# Patient Record
Sex: MALE | Race: WHITE | NOT HISPANIC OR LATINO | URBAN - METROPOLITAN AREA
[De-identification: names, ages, dates, MRNs, and addresses within clinical notes are randomized per-mention and may not be internally consistent; named-entity substitution may affect disease eponyms.]

---

## 2017-11-15 ENCOUNTER — TELEPHONE (OUTPATIENT)
Dept: CARDIOLOGY | Facility: MEDICAL CENTER | Age: 42
End: 2017-11-15

## 2017-11-15 NOTE — TELEPHONE ENCOUNTER
"Spoke with patient; first time seeing a cardiologist; no recent testing done; c/o heart racing while laying in bed at night and feeling \"off\"; has been having pains on and off and according to him, \"they are here, there, and then over here\"; patient understands where our office is and will be here for his appointment.  "

## 2017-11-16 ENCOUNTER — OFFICE VISIT (OUTPATIENT)
Dept: CARDIOLOGY | Facility: MEDICAL CENTER | Age: 42
End: 2017-11-16
Payer: COMMERCIAL

## 2017-11-16 VITALS
HEIGHT: 71 IN | HEART RATE: 74 BPM | WEIGHT: 274 LBS | DIASTOLIC BLOOD PRESSURE: 96 MMHG | BODY MASS INDEX: 38.36 KG/M2 | SYSTOLIC BLOOD PRESSURE: 132 MMHG | OXYGEN SATURATION: 94 %

## 2017-11-16 DIAGNOSIS — R20.0 LEFT ARM NUMBNESS: ICD-10-CM

## 2017-11-16 DIAGNOSIS — I10 ESSENTIAL HYPERTENSION, BENIGN: ICD-10-CM

## 2017-11-16 DIAGNOSIS — R00.2 PALPITATIONS: ICD-10-CM

## 2017-11-16 DIAGNOSIS — Z82.49 FAMILY HISTORY OF EARLY CAD: ICD-10-CM

## 2017-11-16 LAB — EKG IMPRESSION: NORMAL

## 2017-11-16 PROCEDURE — 99204 OFFICE O/P NEW MOD 45 MIN: CPT | Performed by: INTERNAL MEDICINE

## 2017-11-16 PROCEDURE — 93000 ELECTROCARDIOGRAM COMPLETE: CPT | Performed by: INTERNAL MEDICINE

## 2017-11-16 RX ORDER — ALLOPURINOL 100 MG/1
100 TABLET ORAL DAILY
COMMUNITY

## 2017-11-16 RX ORDER — INDOMETHACIN 50 MG/1
50 CAPSULE ORAL 3 TIMES DAILY PRN
COMMUNITY

## 2017-11-16 RX ORDER — BENAZEPRIL HYDROCHLORIDE 40 MG/1
40 TABLET ORAL DAILY
COMMUNITY
End: 2018-01-24 | Stop reason: SDUPTHER

## 2017-11-16 RX ORDER — AMLODIPINE BESYLATE 10 MG/1
10 TABLET ORAL DAILY
COMMUNITY
End: 2018-01-24 | Stop reason: SDUPTHER

## 2017-11-16 RX ORDER — METOPROLOL SUCCINATE 25 MG/1
25 TABLET, EXTENDED RELEASE ORAL DAILY
COMMUNITY
End: 2018-01-24 | Stop reason: SDUPTHER

## 2017-11-16 NOTE — PROGRESS NOTES
Subjective:   Michele Garner is a 42 y.o. male who presents today For initial consultation regarding palpitations and left arm numbness. He is a  with a history of hypertension for at least a decade as well as obstructive sleep apnea currently on Cipro. His blood pressures been well controlled. He has a history of PVCs controlled with metoprolol. He notes that over the past several weeks to months he has left inferior forearm and pinky numbness especially when driving rests his elbow on the center console which exacerbates it. Also of note 2 days ago he was awoken from sleep suddenly with racing heartbeats that slowly subsided. He was wearing his seatbelt at the time. He does not recall having a bad dream. 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 He has not had any syncope or chest pain. He has no exertional symptoms. He is not particularly active. He has a strong family history of precocious coronary disease.    Denies any other cardiovascular symptoms including chest pain, shortness of breath, dyspnea on exertion, lightheadedness, syncope or presyncope, lower extremity edema, PND, orthopnea or palpitations.      Past Medical History:   Diagnosis Date   • HTN (hypertension)      History reviewed. No pertinent surgical history.  Family History   Problem Relation Age of Onset   • Heart Attack Father 55   • Heart Disease Father      History   Smoking Status   • Former Smoker   • Quit date: 11/16/2007   Smokeless Tobacco   • Former User   • Types: Chew   • Quit date: 11/16/2007     Allergies   Allergen Reactions   • Chlorthalidone      Outpatient Encounter Prescriptions as of 11/16/2017   Medication Sig Dispense Refill   • indomethacin (INDOCIN) 50 MG Cap Take 50 mg by mouth 3 times a day as needed.     • amlodipine (NORVASC) 10 MG Tab Take 10 mg by mouth every day.     • allopurinol (ZYLOPRIM) 100 MG Tab  "Take 200 mg by mouth every day.     • benazepril (LOTENSIN) 40 MG tablet Take 40 mg by mouth every day.     • metoprolol SR (TOPROL XL) 25 MG TABLET SR 24 HR Take 25 mg by mouth every day.       No facility-administered encounter medications on file as of 11/16/2017.      Review of Systems   All other systems reviewed and are negative.       Objective:   /96   Pulse 74   Ht 1.803 m (5' 11\")   Wt 124.3 kg (274 lb)   SpO2 94%   BMI 38.22 kg/m²     Physical Exam   Constitutional: He is oriented to person, place, and time. He appears well-developed and well-nourished. No distress.   Overweight   HENT:   Head: Normocephalic and atraumatic.   Mouth/Throat: Oropharynx is clear and moist.   Eyes: Conjunctivae are normal. Pupils are equal, round, and reactive to light. No scleral icterus.   Neck: No JVD present. No tracheal deviation present. No thyromegaly present.   Cardiovascular: Normal rate, regular rhythm, S1 normal, normal heart sounds and intact distal pulses.  PMI is not displaced.  Exam reveals no gallop and no friction rub.    No murmur heard.  Pulses:       Carotid pulses are 2+ on the right side, and 2+ on the left side.       Radial pulses are 2+ on the right side, and 2+ on the left side.        Dorsalis pedis pulses are 2+ on the right side, and 2+ on the left side.        Posterior tibial pulses are 2+ on the right side, and 2+ on the left side.   Pulmonary/Chest: Effort normal and breath sounds normal. He has no wheezes. He has no rales.   Abdominal: Soft. Bowel sounds are normal. He exhibits no distension and no pulsatile midline mass. There is no tenderness. There is no guarding.   Musculoskeletal: He exhibits edema (trace bilateral lower extremities).   Left inferior forearm tingling sensation when palpated near the elbow   Neurological: He is alert and oriented to person, place, and time. No cranial nerve deficit (cranial nerves II through XII grossly intact).   Skin: Skin is warm and dry. No " rash noted. He is not diaphoretic. No erythema.   Psychiatric: He has a normal mood and affect. His behavior is normal. Thought content normal.     EKG (11/16/2017):  I have personally reviewed the EKG this visit and discussed with the patient. It shows sinus rhythm 76 bpm. NORMAL ECG.      Assessment:     1. Palpitations  EKG    ECHOCARDIOGRAM COMP W/O CONT    ECHO-REST/STRESS W/O CONTRAST   2. Left arm numbness  NM-CARDIAC STRESS TEST    ECHO-REST/STRESS W/O CONTRAST   3. Essential hypertension, benign     4. Family history of early CAD  NM-CARDIAC STRESS TEST       Medical Decision Making:  Today's Assessment / Status / Plan:     His left arm numbness seems to be neurologic in origin related to an ulnar neuropathy. We discussed avoidance measures for this and treatment regimen. His palpitations may be a cluster of PVCs versus a need to re-titrate his sleep apnea mask versus a bad dream. He does not recall having a bad dream however. He has significant risk factors for precocious coronary disease himself, with a strong family history and long-standing hypertension as well as sleep apnea obesity and inactivity. Recommend further evaluation. In the meantime he was reassured that these do not sound sinister at the moment, and that further testing will hopefully bear this out.    1. Echocardiogram  2. Stress echocardiogram    Follow-up after testing. Should he have further palpitations we can increase his beta blocker and consider Holter monitor testing.

## 2017-12-19 ENCOUNTER — HOSPITAL ENCOUNTER (OUTPATIENT)
Dept: CARDIOLOGY | Facility: MEDICAL CENTER | Age: 42
End: 2017-12-19
Attending: INTERNAL MEDICINE
Payer: COMMERCIAL

## 2017-12-19 DIAGNOSIS — Z82.49 FAMILY HISTORY OF EARLY CAD: ICD-10-CM

## 2017-12-19 DIAGNOSIS — R20.0 LEFT ARM NUMBNESS: ICD-10-CM

## 2017-12-19 DIAGNOSIS — R00.2 PALPITATIONS: ICD-10-CM

## 2017-12-19 PROCEDURE — 93350 STRESS TTE ONLY: CPT

## 2017-12-19 PROCEDURE — 93306 TTE W/DOPPLER COMPLETE: CPT

## 2017-12-19 PROCEDURE — 93350 STRESS TTE ONLY: CPT | Mod: 26 | Performed by: INTERNAL MEDICINE

## 2017-12-19 PROCEDURE — 93306 TTE W/DOPPLER COMPLETE: CPT | Mod: 26,59 | Performed by: INTERNAL MEDICINE

## 2017-12-19 PROCEDURE — 93017 CV STRESS TEST TRACING ONLY: CPT

## 2017-12-19 PROCEDURE — 93018 CV STRESS TEST I&R ONLY: CPT | Performed by: INTERNAL MEDICINE

## 2017-12-20 LAB
LV EJECT FRACT  99904: 60
LV EJECT FRACT  99904: 60
LV EJECT FRACT MOD 2C 99903: 20.17
LV EJECT FRACT MOD 4C 99902: 37.41
LV EJECT FRACT MOD BP 99901: 28.75

## 2017-12-22 ENCOUNTER — TELEPHONE (OUTPATIENT)
Dept: CARDIOLOGY | Facility: MEDICAL CENTER | Age: 42
End: 2017-12-22

## 2017-12-23 NOTE — TELEPHONE ENCOUNTER
"Pt notified of test results. He is very happy with information. He denies any palpitations and says he has been \"feeling good\". Pt will call back to schedule FU at a later date. He denies further needs or questions at this time.     ---------------------------------------------------  ECHOCARDIOGRAM COMP W/O CONT     Notes Recorded by Erik Underwood M.D. on 12/22/2017 at 4:17 PM PST  Excellent stress test and echocardiogram. Both completely normal and reassuring.     ----- Message from Marifer Hernandez sent at 12/22/2017  3:49 PM PST -----  Regarding: patient calling for echo results  HIEU/Rosaline      Patient had echo on 12/19, wants to get results so he can start exercising again. He can be reached at [phone].                "

## 2018-01-24 ENCOUNTER — TELEPHONE (OUTPATIENT)
Dept: CARDIOLOGY | Facility: MEDICAL CENTER | Age: 43
End: 2018-01-24

## 2018-01-24 DIAGNOSIS — I10 ESSENTIAL HYPERTENSION, BENIGN: ICD-10-CM

## 2018-01-24 DIAGNOSIS — R00.2 PALPITATIONS: ICD-10-CM

## 2018-01-24 RX ORDER — METOPROLOL SUCCINATE 25 MG/1
25 TABLET, EXTENDED RELEASE ORAL DAILY
Qty: 90 TAB | Refills: 1 | OUTPATIENT
Start: 2018-01-24 | End: 2018-07-04 | Stop reason: SDUPTHER

## 2018-01-24 RX ORDER — AMLODIPINE BESYLATE 10 MG/1
10 TABLET ORAL DAILY
Qty: 90 TAB | Refills: 1 | OUTPATIENT
Start: 2018-01-24 | End: 2018-07-04 | Stop reason: SDUPTHER

## 2018-01-24 RX ORDER — BENAZEPRIL HYDROCHLORIDE 40 MG/1
40 TABLET ORAL DAILY
Qty: 90 TAB | Refills: 1 | OUTPATIENT
Start: 2018-01-24 | End: 2018-07-04 | Stop reason: SDUPTHER

## 2018-01-24 NOTE — TELEPHONE ENCOUNTER
Called and s/w pt. He says that he will be in town for another 3 months or so and is needed a refill on cardiac medications. Reassured pt that medication will be called in today. Pt would like to schedule FU with TW, appointment scheduled for 1/31 @1530    Refill called into [facility] for Metoprolol XL 25mg qDaily #90 with 1 refills, Benazepril 40mg qDaily #90 with 1 refill, and Amlodipine 10mg qDaily #90 with 1 refill.     ----- Message from Marifer Hernandez sent at 1/24/2018 12:09 PM PST -----  Regarding: asking if Dr. Underwood will refill his heart medication  TW/Rosaline    Patient is asking if Dr. Underwood would be willing to refill his Metoprolol, Amlodipine and Benazepril, or would he need to be seen in the office first? He states he has about 9 days left on his medication, and his doctor in [place] is unwilling to refill them without an office visit first. He can be reached at [phone].

## 2018-01-31 ENCOUNTER — OFFICE VISIT (OUTPATIENT)
Dept: CARDIOLOGY | Facility: MEDICAL CENTER | Age: 43
End: 2018-01-31
Payer: COMMERCIAL

## 2018-01-31 VITALS
OXYGEN SATURATION: 93 % | BODY MASS INDEX: 38.92 KG/M2 | DIASTOLIC BLOOD PRESSURE: 94 MMHG | SYSTOLIC BLOOD PRESSURE: 152 MMHG | HEIGHT: 71 IN | HEART RATE: 80 BPM | WEIGHT: 278 LBS

## 2018-01-31 DIAGNOSIS — Z82.49 FAMILY HISTORY OF EARLY CAD: ICD-10-CM

## 2018-01-31 DIAGNOSIS — E66.9 OBESITY WITHOUT SERIOUS COMORBIDITY, UNSPECIFIED CLASSIFICATION, UNSPECIFIED OBESITY TYPE: ICD-10-CM

## 2018-01-31 DIAGNOSIS — I51.7 LVH (LEFT VENTRICULAR HYPERTROPHY): ICD-10-CM

## 2018-01-31 DIAGNOSIS — G47.33 OSA ON CPAP: ICD-10-CM

## 2018-01-31 DIAGNOSIS — I10 ESSENTIAL HYPERTENSION, BENIGN: ICD-10-CM

## 2018-01-31 PROBLEM — R20.0 LEFT ARM NUMBNESS: Status: RESOLVED | Noted: 2017-11-16 | Resolved: 2018-01-31

## 2018-01-31 PROBLEM — R00.2 PALPITATIONS: Status: RESOLVED | Noted: 2017-11-16 | Resolved: 2018-01-31

## 2018-01-31 PROCEDURE — 99214 OFFICE O/P EST MOD 30 MIN: CPT | Performed by: INTERNAL MEDICINE

## 2018-01-31 NOTE — PROGRESS NOTES
Subjective:   Michele Garner is a 42 y.o. male who presents today for follow-up of left arm numbness and hypertension. He is a  with a history of hypertension for at least a decade as well as obstructive sleep apnea currently on Cipro. His blood pressures been well controlled. He has a history of PVCs controlled with metoprolol. He notes that over the past several weeks to months he has left inferior forearm and pinky numbness especially when driving rests his elbow on the center console which exacerbates it. Also of note 2 days ago he was awoken from sleep suddenly with racing heartbeats that slowly subsided. He was wearing his seatbelt at the time. He does not recall having a bad dream. 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 He has not had any syncope or chest pain. He has no exertional symptoms. He is not particularly active. He has a strong family history of precocious coronary disease.    In the interim with ulnar nerve compression syndrome alterations he has resolution of his hand numbness. He had a normal echocardiogram site for mild LVH and normal stress echo. Get a superior functional capacity achieving >12 METs.    Denies any other cardiovascular symptoms including chest pain, shortness of breath, dyspnea on exertion, lightheadedness, syncope or presyncope, lower extremity edema, PND, orthopnea or palpitations.      Past Medical History:   Diagnosis Date   • HTN (hypertension)      History reviewed. No pertinent surgical history.  Family History   Problem Relation Age of Onset   • Heart Attack Father 55   • Heart Disease Father      History   Smoking Status   • Former Smoker   • Quit date: 11/16/2007   Smokeless Tobacco   • Former User   • Types: Chew   • Quit date: 11/16/2007     Allergies   Allergen Reactions   • Chlorthalidone      Outpatient Encounter Prescriptions as of 1/31/2018  "  Medication Sig Dispense Refill   • amlodipine (NORVASC) 10 MG Tab Take 1 Tab by mouth every day. 90 Tab 1   • benazepril (LOTENSIN) 40 MG tablet Take 1 Tab by mouth every day. 90 Tab 1   • metoprolol SR (TOPROL XL) 25 MG TABLET SR 24 HR Take 1 Tab by mouth every day. 90 Tab 1   • indomethacin (INDOCIN) 50 MG Cap Take 50 mg by mouth 3 times a day as needed.     • allopurinol (ZYLOPRIM) 100 MG Tab Take 200 mg by mouth every day.       No facility-administered encounter medications on file as of 1/31/2018.      Review of Systems   All other systems reviewed and are negative.       Objective:   /94   Pulse 80   Ht 1.803 m (5' 11\")   Wt (!) 126.1 kg (278 lb)   SpO2 93%   BMI 38.77 kg/m²     Physical Exam   Constitutional: He is oriented to person, place, and time. He appears well-developed and well-nourished. No distress.   Overweight   HENT:   Head: Normocephalic and atraumatic.   Mouth/Throat: Oropharynx is clear and moist.   Eyes: Conjunctivae are normal. Pupils are equal, round, and reactive to light. No scleral icterus.   Neck: No JVD present. No tracheal deviation present. No thyromegaly present.   Cardiovascular: Normal rate, regular rhythm, S1 normal, normal heart sounds and intact distal pulses.  PMI is not displaced.  Exam reveals no gallop and no friction rub.    No murmur heard.  Pulses:       Carotid pulses are 2+ on the right side, and 2+ on the left side.       Radial pulses are 2+ on the right side, and 2+ on the left side.        Dorsalis pedis pulses are 2+ on the right side, and 2+ on the left side.        Posterior tibial pulses are 2+ on the right side, and 2+ on the left side.   Pulmonary/Chest: Effort normal and breath sounds normal. He has no wheezes. He has no rales.   Abdominal: Soft. Bowel sounds are normal. He exhibits no distension and no pulsatile midline mass. There is no tenderness. There is no guarding.   Musculoskeletal: He exhibits no edema or tenderness.   Neurological: He " is alert and oriented to person, place, and time. No cranial nerve deficit (cranial nerves II through XII grossly intact).   Skin: Skin is warm and dry. No rash noted. He is not diaphoretic. No erythema.   Psychiatric: He has a normal mood and affect. His behavior is normal. Thought content normal.     ECHO CONCLUSIONS (12/19/2017):  Normal left ventricular size, systolic function, and diastolic   function.  Mild left ventricular hypertrophy.  Left ventricular ejection fraction is visually estimated to be 60%.  No significant valve abnormalities.   Normal inferior vena cava size and inspiratory collapse.  No prior study is available for comparison.       STRESS (12/19/2017):  Negative stress echocardiogram for ischemia with adequate stress achieved by   heart rate criteria.   Excellent exercise tolerance, achieving 12.8 METS and 91% of max predicted   heart rate.       EKG (11/16/2017):  I have personally reviewed the EKG this visit and discussed with the patient. It shows sinus rhythm 76 bpm. NORMAL ECG.      Assessment:     1. Essential hypertension, benign     2. Family history of early CAD     3. LVH (left ventricular hypertrophy)  REFERRAL TO NUTRITION SERVICES   4. Obesity without serious comorbidity, unspecified classification, unspecified obesity type  REFERRAL TO NUTRITION SERVICES   5. GERARDO on CPAP         Medical Decision Making:  Today's Assessment / Status / Plan:     Blood pressures at home are better. Recheck today after he sat for 2 minutes was in the low 140s to high 130s. Recommend he get a blood pressure cuff and check this at home. Given his LVH think we should recheck an echocardiogram after he is aggressive about his blood pressure control over the next 18 months to 24 months. Otherwise his numb arm numbness was due to ulnar compression from posture and has resolved.      Follow-up in 18 months

## 2018-02-01 ENCOUNTER — TELEPHONE (OUTPATIENT)
Dept: CARDIOLOGY | Facility: MEDICAL CENTER | Age: 43
End: 2018-02-01

## 2018-02-01 NOTE — TELEPHONE ENCOUNTER
Message     [facility] referral will be faxed to:   [facility]   [phone]     Noted.

## 2018-07-04 DIAGNOSIS — R00.2 PALPITATIONS: ICD-10-CM

## 2018-07-04 DIAGNOSIS — I10 ESSENTIAL HYPERTENSION, BENIGN: ICD-10-CM

## 2018-07-05 RX ORDER — AMLODIPINE BESYLATE 10 MG/1
TABLET ORAL
Qty: 90 TAB | Refills: 3 | Status: SHIPPED | OUTPATIENT
Start: 2018-07-05 | End: 2024-03-11

## 2018-07-05 RX ORDER — METOPROLOL SUCCINATE 25 MG/1
TABLET, EXTENDED RELEASE ORAL
Qty: 90 TAB | Refills: 3 | Status: SHIPPED | OUTPATIENT
Start: 2018-07-05

## 2018-07-05 RX ORDER — BENAZEPRIL HYDROCHLORIDE 40 MG/1
TABLET ORAL
Qty: 90 TAB | Refills: 3 | Status: SHIPPED | OUTPATIENT
Start: 2018-07-05

## 2022-06-02 ENCOUNTER — OFFICE VISIT (OUTPATIENT)
Dept: URGENT CARE | Facility: PHYSICIAN GROUP | Age: 47
End: 2022-06-02
Payer: COMMERCIAL

## 2022-06-02 VITALS
HEART RATE: 69 BPM | OXYGEN SATURATION: 97 % | SYSTOLIC BLOOD PRESSURE: 122 MMHG | TEMPERATURE: 96.6 F | WEIGHT: 230 LBS | HEIGHT: 71 IN | RESPIRATION RATE: 16 BRPM | DIASTOLIC BLOOD PRESSURE: 86 MMHG | BODY MASS INDEX: 32.2 KG/M2

## 2022-06-02 DIAGNOSIS — J22 LRTI (LOWER RESPIRATORY TRACT INFECTION): ICD-10-CM

## 2022-06-02 PROCEDURE — 99203 OFFICE O/P NEW LOW 30 MIN: CPT | Performed by: PHYSICIAN ASSISTANT

## 2022-06-02 RX ORDER — PREDNISONE 20 MG/1
40 TABLET ORAL DAILY
Qty: 10 TABLET | Refills: 0 | Status: SHIPPED | OUTPATIENT
Start: 2022-06-02 | End: 2022-06-07

## 2022-06-02 RX ORDER — AZITHROMYCIN 250 MG/1
TABLET, FILM COATED ORAL
Qty: 6 TABLET | Refills: 0 | Status: SHIPPED | OUTPATIENT
Start: 2022-06-02 | End: 2024-03-11

## 2022-06-02 ASSESSMENT — ENCOUNTER SYMPTOMS
SINUS PAIN: 0
FEVER: 0
WHEEZING: 0
DIZZINESS: 0
SPUTUM PRODUCTION: 1
SORE THROAT: 0
ABDOMINAL PAIN: 0
PALPITATIONS: 0
MYALGIAS: 0
NAUSEA: 0
HEADACHES: 1
DIARRHEA: 0
CHILLS: 1
DIAPHORESIS: 0
VOMITING: 0
SHORTNESS OF BREATH: 0
COUGH: 1

## 2022-06-02 NOTE — PROGRESS NOTES
Subjective:     CHIEF COMPLAINT  Chief Complaint   Patient presents with   • Cough     Coughing  is dry and cough, cough phlegms yellowish, headache, and sweating at night x3 weeks    • Coronavirus Screening     Tested postive 21st of may. Retested may 26th negative .        HPI  Michele Garner is a very pleasant 47 y.o. male who presents to the clinic with persistent cough and congestion x3 weeks.  Patient states he tested positive for COVID-19 on 5/21/2022.  He then had a negative test on 5/26/2022.  States over the last 3 weeks he has had a cough that is deep and productive of yellowish phlegm.  He denies any associated shortness of breath.  He has had intermittent body aches and chills.  Does not believe he has been running a fever.  He has been taking various over-the-counter cough medications with no improvement.  Tolerating oral intake without complication.    REVIEW OF SYSTEMS  Review of Systems   Constitutional: Positive for chills. Negative for diaphoresis, fever and malaise/fatigue.   HENT: Negative for congestion, ear pain, sinus pain and sore throat.    Respiratory: Positive for cough and sputum production. Negative for shortness of breath and wheezing.    Cardiovascular: Negative for chest pain and palpitations.   Gastrointestinal: Negative for abdominal pain, diarrhea, nausea and vomiting.   Musculoskeletal: Negative for myalgias.   Neurological: Positive for headaches. Negative for dizziness.   Endo/Heme/Allergies: Negative for environmental allergies.       PAST MEDICAL HISTORY  Patient Active Problem List    Diagnosis Date Noted   • GERARDO on CPAP 01/31/2018   • Obesity without serious comorbidity 01/31/2018   • LVH (left ventricular hypertrophy) 01/31/2018   • Essential hypertension, benign 11/16/2017   • Family history of early CAD 11/16/2017       SURGICAL HISTORY  patient denies any surgical history    ALLERGIES  Allergies   Allergen Reactions   • Chlorthalidone        CURRENT MEDICATIONS  Home  "Medications     Reviewed by Charlie Medina P.A.-C. (Physician Assistant) on 22 at 0931  Med List Status: <None>   Medication Last Dose Status   allopurinol (ZYLOPRIM) 100 MG Tab Taking Active   amLODIPine (NORVASC) 10 MG Tab Taking Active   benazepril (LOTENSIN) 40 MG tablet Taking Active   indomethacin (INDOCIN) 50 MG Cap Not Taking Active   metoprolol SR (TOPROL XL) 25 MG TABLET SR 24 HR Taking Active                SOCIAL HISTORY  Social History     Tobacco Use   • Smoking status: Former Smoker     Quit date: 2007     Years since quittin.5   • Smokeless tobacco: Former User     Types: Chew     Quit date: 2007   Substance and Sexual Activity   • Alcohol use: Yes   • Drug use: No   • Sexual activity: Not on file       FAMILY HISTORY  Family History   Problem Relation Age of Onset   • Heart Attack Father 55   • Heart Disease Father           Objective:     VITAL SIGNS: /86   Pulse 69   Temp 35.9 °C (96.6 °F) (Tympanic)   Resp 16   Ht 1.803 m (5' 11\")   Wt 104 kg (230 lb)   SpO2 97%   BMI 32.08 kg/m²     PHYSICAL EXAM  Physical Exam  Constitutional:       General: He is not in acute distress.     Appearance: Normal appearance. He is not ill-appearing, toxic-appearing or diaphoretic.   HENT:      Head: Normocephalic and atraumatic.      Right Ear: Tympanic membrane, ear canal and external ear normal.      Left Ear: Tympanic membrane, ear canal and external ear normal.      Nose: Congestion present. No rhinorrhea.      Mouth/Throat:      Mouth: Mucous membranes are moist.      Pharynx: No oropharyngeal exudate or posterior oropharyngeal erythema.   Eyes:      Conjunctiva/sclera: Conjunctivae normal.   Cardiovascular:      Rate and Rhythm: Normal rate and regular rhythm.      Pulses: Normal pulses.      Heart sounds: Normal heart sounds.   Pulmonary:      Effort: Pulmonary effort is normal.      Breath sounds: Normal breath sounds. No wheezing, rhonchi or rales.   Musculoskeletal:     "  Cervical back: Normal range of motion. No muscular tenderness.   Lymphadenopathy:      Cervical: No cervical adenopathy.   Skin:     General: Skin is warm and dry.      Capillary Refill: Capillary refill takes less than 2 seconds.   Neurological:      Mental Status: He is alert.   Psychiatric:         Mood and Affect: Mood normal.         Thought Content: Thought content normal.         Assessment/Plan:     1. LRTI (lower respiratory tract infection)  - predniSONE (DELTASONE) 20 MG Tab; Take 2 Tablets by mouth every day for 5 days.  Dispense: 10 Tablet; Refill: 0  - azithromycin (ZITHROMAX) 250 MG Tab; Take 2 tablets PO on day one, then 1 tablet PO on day two to five.  Dispense: 6 Tablet; Refill: 0      MDM/Comments:    Patient presents to the clinic with a persistent cough x3 weeks.  Had COVID-19 10 days ago with a subsequent negative test on 5/26/2022.  Has been dealing with a persistent cough that is producing discolored sputum.  Also has intermittent chills and body aches.  On exam lung sounds clear to auscultation.  No wheezes rhonchi or rales.  SPO2 97% on room air.  Due to the nature and length of illness we will begin treatment with oral steroids and antibiotics.  Supportive recommendations discussed.  Increase fluid intake.  Return precautions discussed for any worsening symptoms.    Differential diagnosis, natural history, supportive care, and indications for immediate follow-up discussed. All questions answered. Patient agrees with the plan of care.    Follow-up as needed if symptoms worsen or fail to improve to PCP, Urgent care or Emergency Room.    I have personally reviewed prior external notes and test results pertinent to today's visit.  I have independently reviewed and interpreted all diagnostics ordered during this urgent care acute visit.   Discussed management options (risks,benefits, and alternatives to treatment). Pt expresses understanding and the treatment plan was agreed upon. Questions  were encouraged and answered to pt's satisfaction.    Please note that this dictation was created using voice recognition software. I have made a reasonable attempt to correct obvious errors, but I expect that there are errors of grammar and possibly content that I did not discover before finalizing the note.

## 2024-03-11 ENCOUNTER — OFFICE VISIT (OUTPATIENT)
Dept: CARDIOLOGY | Facility: MEDICAL CENTER | Age: 49
End: 2024-03-11
Attending: INTERNAL MEDICINE
Payer: COMMERCIAL

## 2024-03-11 VITALS
BODY MASS INDEX: 37.44 KG/M2 | RESPIRATION RATE: 14 BRPM | OXYGEN SATURATION: 94 % | HEIGHT: 71 IN | DIASTOLIC BLOOD PRESSURE: 78 MMHG | HEART RATE: 94 BPM | WEIGHT: 267.4 LBS | SYSTOLIC BLOOD PRESSURE: 112 MMHG

## 2024-03-11 DIAGNOSIS — Z76.89 ENCOUNTER TO ESTABLISH CARE: ICD-10-CM

## 2024-03-11 DIAGNOSIS — G47.33 OSA ON CPAP: ICD-10-CM

## 2024-03-11 DIAGNOSIS — E78.5 DYSLIPIDEMIA: ICD-10-CM

## 2024-03-11 DIAGNOSIS — E66.9 OBESITY WITHOUT SERIOUS COMORBIDITY, UNSPECIFIED CLASSIFICATION, UNSPECIFIED OBESITY TYPE: ICD-10-CM

## 2024-03-11 DIAGNOSIS — I51.7 LVH (LEFT VENTRICULAR HYPERTROPHY): ICD-10-CM

## 2024-03-11 DIAGNOSIS — I10 ESSENTIAL HYPERTENSION, BENIGN: ICD-10-CM

## 2024-03-11 PROCEDURE — 3074F SYST BP LT 130 MM HG: CPT | Performed by: INTERNAL MEDICINE

## 2024-03-11 PROCEDURE — 99204 OFFICE O/P NEW MOD 45 MIN: CPT | Performed by: INTERNAL MEDICINE

## 2024-03-11 PROCEDURE — 3078F DIAST BP <80 MM HG: CPT | Performed by: INTERNAL MEDICINE

## 2024-03-11 PROCEDURE — 99202 OFFICE O/P NEW SF 15 MIN: CPT | Performed by: INTERNAL MEDICINE

## 2024-03-11 PROCEDURE — 93005 ELECTROCARDIOGRAM TRACING: CPT | Performed by: INTERNAL MEDICINE

## 2024-03-11 RX ORDER — DOXAZOSIN 2 MG/1
2 TABLET ORAL DAILY
Qty: 90 TABLET | Refills: 3 | Status: SHIPPED | OUTPATIENT
Start: 2024-03-11

## 2024-03-11 RX ORDER — MULTIVIT-MIN/IRON FUM/FOLIC AC 7.5 MG-4
1 TABLET ORAL DAILY
COMMUNITY

## 2024-03-11 NOTE — ASSESSMENT & PLAN NOTE
Regarding his sleep apnea I encouraged him to continue to utilize his CPAP and follow-up closely with his primary sleep medicine specialist regarding ongoing needs given that he has noted a 30 pound weight gain that is likely contributing to changes in his overall CPAP.  It is uncertain whether or not he has an auto titrating CPAP or may need to be transitioned over to BiPAP.

## 2024-03-11 NOTE — ASSESSMENT & PLAN NOTE
Noted on his prior echocardiogram and would recommend that he continue to focus in on lifestyle modification as well as blood pressure management.

## 2024-03-11 NOTE — ASSESSMENT & PLAN NOTE
Clinically, his blood pressure is well-controlled on his current regimen however he does have significant lower extremity edema and is currently on amlodipine grams daily that is likely contributing lower extremity edema as well as likely some underlying varicose veins given that he predominantly stands throughout his stay as well as on very hard surfaces.  At this time we will stop his amlodipine in favor of transitioning over to doxazosin 2 mg daily as well as encouraged to utilize low-grade compression stockings when he is at work and on hard surfaces.  He will continue to monitor his blood pressure at home.

## 2024-03-11 NOTE — PROGRESS NOTES
[facility]    PatientName:Michele GarnerDate: 3/11/2024  :1975    49 y.o.PCP:Erik Underwood M.D.  MRN:[id number]          Problems and Plans    Essential hypertension, benign  Clinically, his blood pressure is well-controlled on his current regimen however he does have significant lower extremity edema and is currently on amlodipine grams daily that is likely contributing lower extremity edema as well as likely some underlying varicose veins given that he predominantly stands throughout his stay as well as on very hard surfaces.  At this time we will stop his amlodipine in favor of transitioning over to doxazosin 2 mg daily as well as encouraged to utilize low-grade compression stockings when he is at work and on hard surfaces.  He will continue to monitor his blood pressure at home.    GERARDO on CPAP  Regarding his sleep apnea I encouraged him to continue to utilize his CPAP and follow-up closely with his primary sleep medicine specialist regarding ongoing needs given that he has noted a 30 pound weight gain that is likely contributing to changes in his overall CPAP.  It is uncertain whether or not he has an auto titrating CPAP or may need to be transitioned over to BiPAP.    LVH (left ventricular hypertrophy)  Noted on his prior echocardiogram and would recommend that he continue to focus in on lifestyle modification as well as blood pressure management.    Obesity without serious comorbidity  Regarding his overall obesity this is likely due to caloric indiscretion and lack of physical exercise as well as likely some underlying insulin resistance.  It would not be unreasonable to check his lipid panel, hemoglobin A1c, CBC and comprehensive panel likely in anticipation of medication to help with insulin resistance.  Further recommendations will follow pending his overall evaluation.    Return in about 3 months (around 2024).      Encounter    Reason for Visit /  Chief Complaint: Hypertension    HPI    49-year-old male with known history of hypertension, borderline impaired fasting glucose, borderline dyslipidemia, gout presents in consultation for establishment of care and ongoing management of his hypertension.    Currently, he notes he is feeling well but notes that he has been hypertensive for roughly 15 years and does monitor his blood pressure at home which she gets systolic blood pressure range of 120 upwards of 140 mmHg and diastolic 80 upwards of 95 mmHg.  He has gained approximately 30 pounds which she cites due to poor diet and lack of exercise.  He works as a commercial  and part business owner.  He notes having sleep apnea and has been utilizing his CPAP but has seen increasing pressures and is scheduled to undergo further evaluation with his sleep medicine team.  His other clinical concern is that he does have some lower extremity edema that persists throughout the day with out significant improvement in the morning after elevating his legs.  He does note that his 30 pound weight gain did cause some of his lower extremity edema  Past Medical History  Past Medical History:   Diagnosis Date    HTN (hypertension)      Past Surgical History  History reviewed. No pertinent surgical history.  Social History  Social History     Socioeconomic History    Marital status:      Spouse name: Not on file    Number of children: Not on file    Years of education: Not on file    Highest education level: Not on file   Occupational History    Not on file   Tobacco Use    Smoking status: Former    Smokeless tobacco: Former     Types: Chew     Quit date: 11/16/2007   Substance and Sexual Activity    Alcohol use: Yes    Drug use: No    Sexual activity: Not on file   Other Topics Concern    Not on file   Social History Narrative    Not on file     Social Determinants of Health     Financial Resource Strain: Not on file   Food Insecurity: Not on file  "  Transportation Needs: Not on file   Physical Activity: Not on file   Stress: Not on file   Social Connections: Not on file   Intimate Partner Violence: Not on file   Housing Stability: Not on file     Past Family History  Family History   Problem Relation Age of Onset    Heart Attack Father 55    Heart Disease Father      Medication(s)    Current Outpatient Medications:     multivitamin with minerals, 1 Tablet, Oral, DAILY, Taking    doxazosin, 2 mg, Oral, DAILY    metoprolol SR, TAKE 1 TABLET BY MOUTH EVERY DAY (Patient taking differently: 50 mg), Taking Differently    benazepril, TAKE 1 TABLET BY MOUTH EVERY DAY, Taking    indomethacin, 50 mg, Oral, TID PRN, PRN    allopurinol, 100 mg, Oral, DAILY, Taking Differently  Allergies  Chlorthalidone    Review of Systems    A comprehensive 10 system review was conducted and is negative except as noted above in the HPI or here.      Vital Signs  /78 (BP Location: Left arm, Patient Position: Sitting, BP Cuff Size: Adult)   Pulse 94   Resp 14   Ht 1.803 m (5' 11\")   Wt 121 kg (267 lb 6.4 oz)   SpO2 94%   BMI 37.29 kg/m²     Physical Exam  Constitutional:       Appearance: Normal appearance. He is obese.   HENT:      Head: Normocephalic and atraumatic.      Mouth/Throat:      Mouth: Mucous membranes are moist.      Pharynx: Oropharynx is clear.   Eyes:      Extraocular Movements: Extraocular movements intact.      Conjunctiva/sclera: Conjunctivae normal.   Cardiovascular:      Rate and Rhythm: Normal rate and regular rhythm.      Pulses: Normal pulses.      Heart sounds: Normal heart sounds. No murmur heard.     No friction rub. No gallop.   Pulmonary:      Effort: Pulmonary effort is normal.      Breath sounds: Normal breath sounds.   Abdominal:      General: Bowel sounds are normal.      Palpations: Abdomen is soft.   Musculoskeletal:         General: Normal range of motion.      Cervical back: Normal range of motion and neck supple.      Right lower leg: " "Edema (1+ pitting) present.      Left lower leg: Edema (1+ pitting) present.   Skin:     General: Skin is warm and dry.   Neurological:      General: No focal deficit present.      Mental Status: He is alert and oriented to person, place, and time. Mental status is at baseline.   Psychiatric:         Mood and Affect: Mood normal.         Behavior: Behavior normal.         Thought Content: Thought content normal.         Judgment: Judgment normal.         No results found for: \"TSHULTRASEN\"   No results found for: \"FREET4\"     Lab Results   Component Value Date/Time    HBA1C 5.8 (H) 05/15/2023 10:02 AM     No results found for: \"CHOLSTRLTOT\", \"LDL\", \"HDL\", \"TRIGLYCERIDE\"    No results found for: \"SODIUM\", \"POTASSIUM\", \"CHLORIDE\", \"CO2\", \"GLUCOSE\", \"BUN\", \"CREATININE\", \"BUNCREATRAT\", \"GLOMRATE\"  No results found for: \"ALKPHOSPHAT\", \"ASTSGOT\", \"ALTSGPT\", \"TBILIRUBIN\"      Imaging  EKG demonstrates sinus rhythm with noted low voltage in the precordial leads.  I reviewed interpreted EKG.  Findings are discussed with the patient        Total patient time was estimated to be 45 minutes consisting of chart review, direct patient interaction, medication renewal, plan development and overall communication with the cardiovascular team.        Electronically signed by:   Yovani Alberto DO, MPH  [facility]    Portions of this note were completed using voice recognition software (Dragon Naturally speaking software) . Occasional transcription errors may have escaped proof reading. I have made every reasonable attempt to correct obvious errors, but I expect that there are errors of grammar and possibly content that I did not discover before finalizing the note.      "

## 2024-03-11 NOTE — ASSESSMENT & PLAN NOTE
Regarding his overall obesity this is likely due to caloric indiscretion and lack of physical exercise as well as likely some underlying insulin resistance.  It would not be unreasonable to check his lipid panel, hemoglobin A1c, CBC and comprehensive panel likely in anticipation of medication to help with insulin resistance.  Further recommendations will follow pending his overall evaluation.

## 2024-03-11 NOTE — PATIENT INSTRUCTIONS
Follow up in 3 months  Check blood work  Stop amlodipine  Start doxazosin 2mg daily  Continue current medications  Call with questions.

## 2024-03-13 LAB — EKG IMPRESSION: NORMAL

## 2024-03-13 PROCEDURE — 93010 ELECTROCARDIOGRAM REPORT: CPT | Performed by: INTERNAL MEDICINE

## 2024-06-27 ENCOUNTER — TELEPHONE (OUTPATIENT)
Dept: CARDIOLOGY | Facility: MEDICAL CENTER | Age: 49
End: 2024-06-27
Payer: COMMERCIAL

## 2024-07-09 ENCOUNTER — TELEPHONE (OUTPATIENT)
Dept: CARDIOLOGY | Facility: MEDICAL CENTER | Age: 49
End: 2024-07-09
Payer: COMMERCIAL

## 2024-07-09 DIAGNOSIS — M79.604 LEG PAIN, BILATERAL: ICD-10-CM

## 2024-07-09 DIAGNOSIS — M79.605 LEG PAIN, BILATERAL: ICD-10-CM

## 2024-07-09 DIAGNOSIS — I10 ESSENTIAL HYPERTENSION, BENIGN: ICD-10-CM

## 2024-07-09 RX ORDER — INDOMETHACIN 50 MG/1
50 CAPSULE ORAL 3 TIMES DAILY
Qty: 270 CAPSULE | Refills: 2 | Status: CANCELLED | OUTPATIENT
Start: 2024-07-09

## 2024-07-10 RX ORDER — AMLODIPINE BESYLATE 10 MG/1
10 TABLET ORAL DAILY
Qty: 90 TABLET | Refills: 3 | Status: SHIPPED | OUTPATIENT
Start: 2024-07-10

## 2024-07-18 ENCOUNTER — APPOINTMENT (OUTPATIENT)
Dept: CARDIOLOGY | Facility: MEDICAL CENTER | Age: 49
End: 2024-07-18
Attending: INTERNAL MEDICINE
Payer: COMMERCIAL